# Patient Record
Sex: FEMALE | Race: WHITE | NOT HISPANIC OR LATINO
[De-identification: names, ages, dates, MRNs, and addresses within clinical notes are randomized per-mention and may not be internally consistent; named-entity substitution may affect disease eponyms.]

---

## 2018-04-24 ENCOUNTER — APPOINTMENT (OUTPATIENT)
Dept: ORTHOPEDIC SURGERY | Facility: CLINIC | Age: 48
End: 2018-04-24
Payer: COMMERCIAL

## 2018-04-24 VITALS
WEIGHT: 150 LBS | BODY MASS INDEX: 24.11 KG/M2 | SYSTOLIC BLOOD PRESSURE: 106 MMHG | HEIGHT: 66 IN | DIASTOLIC BLOOD PRESSURE: 72 MMHG

## 2018-04-24 DIAGNOSIS — M19.071 PRIMARY OSTEOARTHRITIS, RIGHT ANKLE AND FOOT: ICD-10-CM

## 2018-04-24 DIAGNOSIS — S86.111A STRAIN OF OTHER MUSCLE(S) AND TENDON(S) OF POSTERIOR MUSCLE GROUP AT LOWER LEG LEVEL, RIGHT LEG, INITIAL ENCOUNTER: ICD-10-CM

## 2018-04-24 DIAGNOSIS — M20.21 HALLUX RIGIDUS, RIGHT FOOT: ICD-10-CM

## 2018-04-24 DIAGNOSIS — Z87.891 PERSONAL HISTORY OF NICOTINE DEPENDENCE: ICD-10-CM

## 2018-04-24 PROCEDURE — 99214 OFFICE O/P EST MOD 30 MIN: CPT

## 2018-04-24 PROCEDURE — 73630 X-RAY EXAM OF FOOT: CPT | Mod: RT

## 2022-07-25 ENCOUNTER — NON-APPOINTMENT (OUTPATIENT)
Age: 52
End: 2022-07-25

## 2022-10-05 ENCOUNTER — EMERGENCY (EMERGENCY)
Facility: HOSPITAL | Age: 52
LOS: 1 days | Discharge: HOME | End: 2022-10-05
Admitting: EMERGENCY MEDICINE

## 2022-10-05 VITALS
HEIGHT: 67 IN | OXYGEN SATURATION: 96 % | DIASTOLIC BLOOD PRESSURE: 78 MMHG | RESPIRATION RATE: 18 BRPM | WEIGHT: 154.98 LBS | HEART RATE: 72 BPM | SYSTOLIC BLOOD PRESSURE: 120 MMHG | TEMPERATURE: 98 F

## 2022-10-05 LAB
ALBUMIN SERPL ELPH-MCNC: 3.7 G/DL — SIGNIFICANT CHANGE UP (ref 3.4–5)
ALP SERPL-CCNC: 98 U/L — SIGNIFICANT CHANGE UP (ref 40–120)
ALT FLD-CCNC: 16 U/L — SIGNIFICANT CHANGE UP (ref 12–42)
ANION GAP SERPL CALC-SCNC: 4 MMOL/L — LOW (ref 9–16)
AST SERPL-CCNC: 13 U/L — LOW (ref 15–37)
BASOPHILS # BLD AUTO: 0.06 K/UL — SIGNIFICANT CHANGE UP (ref 0–0.2)
BASOPHILS NFR BLD AUTO: 0.9 % — SIGNIFICANT CHANGE UP (ref 0–2)
BILIRUB SERPL-MCNC: 0.2 MG/DL — SIGNIFICANT CHANGE UP (ref 0.2–1.2)
BUN SERPL-MCNC: 13 MG/DL — SIGNIFICANT CHANGE UP (ref 7–23)
CALCIUM SERPL-MCNC: 9.1 MG/DL — SIGNIFICANT CHANGE UP (ref 8.5–10.5)
CHLORIDE SERPL-SCNC: 105 MMOL/L — SIGNIFICANT CHANGE UP (ref 96–108)
CO2 SERPL-SCNC: 30 MMOL/L — SIGNIFICANT CHANGE UP (ref 22–31)
CREAT SERPL-MCNC: 0.83 MG/DL — SIGNIFICANT CHANGE UP (ref 0.5–1.3)
D DIMER BLD IA.RAPID-MCNC: <187 NG/ML DDU — SIGNIFICANT CHANGE UP
EGFR: 86 ML/MIN/1.73M2 — SIGNIFICANT CHANGE UP
EOSINOPHIL # BLD AUTO: 0.27 K/UL — SIGNIFICANT CHANGE UP (ref 0–0.5)
EOSINOPHIL NFR BLD AUTO: 4.1 % — SIGNIFICANT CHANGE UP (ref 0–6)
GLUCOSE SERPL-MCNC: 115 MG/DL — HIGH (ref 70–99)
HCG SERPL-ACNC: 1 MIU/ML — SIGNIFICANT CHANGE UP
HCT VFR BLD CALC: 34 % — LOW (ref 34.5–45)
HGB BLD-MCNC: 11.5 G/DL — SIGNIFICANT CHANGE UP (ref 11.5–15.5)
IMM GRANULOCYTES NFR BLD AUTO: 0.3 % — SIGNIFICANT CHANGE UP (ref 0–0.9)
LYMPHOCYTES # BLD AUTO: 1.66 K/UL — SIGNIFICANT CHANGE UP (ref 1–3.3)
LYMPHOCYTES # BLD AUTO: 24.9 % — SIGNIFICANT CHANGE UP (ref 13–44)
MCHC RBC-ENTMCNC: 31.7 PG — SIGNIFICANT CHANGE UP (ref 27–34)
MCHC RBC-ENTMCNC: 33.8 GM/DL — SIGNIFICANT CHANGE UP (ref 32–36)
MCV RBC AUTO: 93.7 FL — SIGNIFICANT CHANGE UP (ref 80–100)
MONOCYTES # BLD AUTO: 0.6 K/UL — SIGNIFICANT CHANGE UP (ref 0–0.9)
MONOCYTES NFR BLD AUTO: 9 % — SIGNIFICANT CHANGE UP (ref 2–14)
NEUTROPHILS # BLD AUTO: 4.05 K/UL — SIGNIFICANT CHANGE UP (ref 1.8–7.4)
NEUTROPHILS NFR BLD AUTO: 60.8 % — SIGNIFICANT CHANGE UP (ref 43–77)
NRBC # BLD: 0 /100 WBCS — SIGNIFICANT CHANGE UP (ref 0–0)
PLATELET # BLD AUTO: 264 K/UL — SIGNIFICANT CHANGE UP (ref 150–400)
POTASSIUM SERPL-MCNC: 3.7 MMOL/L — SIGNIFICANT CHANGE UP (ref 3.5–5.3)
POTASSIUM SERPL-SCNC: 3.7 MMOL/L — SIGNIFICANT CHANGE UP (ref 3.5–5.3)
PROT SERPL-MCNC: 6.8 G/DL — SIGNIFICANT CHANGE UP (ref 6.4–8.2)
RBC # BLD: 3.63 M/UL — LOW (ref 3.8–5.2)
RBC # FLD: 12.3 % — SIGNIFICANT CHANGE UP (ref 10.3–14.5)
SODIUM SERPL-SCNC: 139 MMOL/L — SIGNIFICANT CHANGE UP (ref 132–145)
TROPONIN I, HIGH SENSITIVITY RESULT: 4.6 NG/L — SIGNIFICANT CHANGE UP
WBC # BLD: 6.66 K/UL — SIGNIFICANT CHANGE UP (ref 3.8–10.5)
WBC # FLD AUTO: 6.66 K/UL — SIGNIFICANT CHANGE UP (ref 3.8–10.5)

## 2022-10-05 PROCEDURE — 71046 X-RAY EXAM CHEST 2 VIEWS: CPT | Mod: 26

## 2022-10-05 PROCEDURE — 99285 EMERGENCY DEPT VISIT HI MDM: CPT | Mod: 25

## 2022-10-05 PROCEDURE — 93010 ELECTROCARDIOGRAM REPORT: CPT | Mod: 59

## 2022-10-05 NOTE — ED ADULT TRIAGE NOTE - CHIEF COMPLAINT QUOTE
Pt. walk in c/o 2 episodes of midsternal CP that happened yesterday night and tonight described as sharp pressure that lasted 10-20 min. Denies n/v/d. Reports SOB only during the episode.

## 2022-10-05 NOTE — ED ADULT NURSE NOTE - NSIMPLEMENTINTERV_GEN_ALL_ED
Implemented All Universal Safety Interventions:  [place] to call system. Call bell, personal items and telephone within reach. Instruct patient to call for assistance. Room bathroom lighting operational. Non-slip footwear when patient is off stretcher. Physically safe environment: no spills, clutter or unnecessary equipment. Stretcher in lowest position, wheels locked, appropriate side rails in place.

## 2022-10-06 NOTE — ED PROVIDER NOTE - OBJECTIVE STATEMENT
51 yo f pmhx sig for breast cancer pw 2 wk of cough and L sided chest pain aching mod in severity non radiating worse with palpation, movement and deep inspiration ongoing for 3 d in duration. Denies n/v, diaphoresis, sob, hematopoesis, abd pain. No unilateral swelling, hemoptysis, estogen supplementation, recent immobilization or surgery, or prior DVT/PE.    I have reviewed available current nursing and previous documentation of past medical, surgical, family, and/or social history.

## 2022-10-06 NOTE — ED PROVIDER NOTE - CARE PROVIDER_API CALL
Meng Prater)  Cardiovascular Disease  [address] [facility] [place] [address]  Phone: [phone]  Fax: [phone]  Follow Up Time: 1-3 Days

## 2022-10-06 NOTE — ED PROVIDER NOTE - PATIENT PORTAL LINK FT
You can access the FollowMyHealth Patient Portal offered by [facility] by registering at the following website: [email or web address]. By joining [employer]’s FollowMyHealth portal, you will also be able to view your health information using other applications (apps) compatible with our system.

## 2022-10-07 DIAGNOSIS — R07.89 OTHER CHEST PAIN: ICD-10-CM

## 2022-10-07 DIAGNOSIS — R05.1 ACUTE COUGH: ICD-10-CM

## 2022-10-07 DIAGNOSIS — Z85.3 PERSONAL HISTORY OF MALIGNANT NEOPLASM OF BREAST: ICD-10-CM

## 2022-12-01 ENCOUNTER — EMERGENCY (EMERGENCY)
Facility: HOSPITAL | Age: 52
LOS: 1 days | Discharge: HOME | End: 2022-12-01
Admitting: EMERGENCY MEDICINE
Payer: COMMERCIAL

## 2022-12-01 VITALS
TEMPERATURE: 98 F | OXYGEN SATURATION: 99 % | RESPIRATION RATE: 16 BRPM | SYSTOLIC BLOOD PRESSURE: 127 MMHG | HEART RATE: 78 BPM | DIASTOLIC BLOOD PRESSURE: 74 MMHG

## 2022-12-01 VITALS
TEMPERATURE: 98 F | OXYGEN SATURATION: 97 % | HEART RATE: 84 BPM | WEIGHT: 156.53 LBS | SYSTOLIC BLOOD PRESSURE: 132 MMHG | RESPIRATION RATE: 16 BRPM | DIASTOLIC BLOOD PRESSURE: 85 MMHG | HEIGHT: 67 IN

## 2022-12-01 DIAGNOSIS — R42 DIZZINESS AND GIDDINESS: ICD-10-CM

## 2022-12-01 DIAGNOSIS — G43.909 MIGRAINE, UNSPECIFIED, NOT INTRACTABLE, WITHOUT STATUS MIGRAINOSUS: ICD-10-CM

## 2022-12-01 DIAGNOSIS — B34.9 VIRAL INFECTION, UNSPECIFIED: ICD-10-CM

## 2022-12-01 DIAGNOSIS — E03.9 HYPOTHYROIDISM, UNSPECIFIED: ICD-10-CM

## 2022-12-01 LAB
ALBUMIN SERPL ELPH-MCNC: 4.1 G/DL — SIGNIFICANT CHANGE UP (ref 3.4–5)
ALP SERPL-CCNC: 104 U/L — SIGNIFICANT CHANGE UP (ref 40–120)
ALT FLD-CCNC: 29 U/L — SIGNIFICANT CHANGE UP (ref 12–42)
ANION GAP SERPL CALC-SCNC: 8 MMOL/L — LOW (ref 9–16)
AST SERPL-CCNC: 19 U/L — SIGNIFICANT CHANGE UP (ref 15–37)
BASOPHILS # BLD AUTO: 0.05 K/UL — SIGNIFICANT CHANGE UP (ref 0–0.2)
BASOPHILS NFR BLD AUTO: 0.7 % — SIGNIFICANT CHANGE UP (ref 0–2)
BILIRUB SERPL-MCNC: 0.3 MG/DL — SIGNIFICANT CHANGE UP (ref 0.2–1.2)
BUN SERPL-MCNC: 14 MG/DL — SIGNIFICANT CHANGE UP (ref 7–23)
CALCIUM SERPL-MCNC: 9.6 MG/DL — SIGNIFICANT CHANGE UP (ref 8.5–10.5)
CHLORIDE SERPL-SCNC: 104 MMOL/L — SIGNIFICANT CHANGE UP (ref 96–108)
CO2 SERPL-SCNC: 30 MMOL/L — SIGNIFICANT CHANGE UP (ref 22–31)
CREAT SERPL-MCNC: 0.75 MG/DL — SIGNIFICANT CHANGE UP (ref 0.5–1.3)
EGFR: 97 ML/MIN/1.73M2 — SIGNIFICANT CHANGE UP
EOSINOPHIL # BLD AUTO: 0.07 K/UL — SIGNIFICANT CHANGE UP (ref 0–0.5)
EOSINOPHIL NFR BLD AUTO: 0.9 % — SIGNIFICANT CHANGE UP (ref 0–6)
GLUCOSE SERPL-MCNC: 104 MG/DL — HIGH (ref 70–99)
HCG SERPL-ACNC: 1 MIU/ML — SIGNIFICANT CHANGE UP
HCT VFR BLD CALC: 41.1 % — SIGNIFICANT CHANGE UP (ref 34.5–45)
HGB BLD-MCNC: 13.6 G/DL — SIGNIFICANT CHANGE UP (ref 11.5–15.5)
IMM GRANULOCYTES NFR BLD AUTO: 0.4 % — SIGNIFICANT CHANGE UP (ref 0–0.9)
LYMPHOCYTES # BLD AUTO: 1.09 K/UL — SIGNIFICANT CHANGE UP (ref 1–3.3)
LYMPHOCYTES # BLD AUTO: 14.7 % — SIGNIFICANT CHANGE UP (ref 13–44)
MCHC RBC-ENTMCNC: 30.8 PG — SIGNIFICANT CHANGE UP (ref 27–34)
MCHC RBC-ENTMCNC: 33.1 GM/DL — SIGNIFICANT CHANGE UP (ref 32–36)
MCV RBC AUTO: 93 FL — SIGNIFICANT CHANGE UP (ref 80–100)
MONOCYTES # BLD AUTO: 0.44 K/UL — SIGNIFICANT CHANGE UP (ref 0–0.9)
MONOCYTES NFR BLD AUTO: 6 % — SIGNIFICANT CHANGE UP (ref 2–14)
NEUTROPHILS # BLD AUTO: 5.71 K/UL — SIGNIFICANT CHANGE UP (ref 1.8–7.4)
NEUTROPHILS NFR BLD AUTO: 77.3 % — HIGH (ref 43–77)
NRBC # BLD: 0 /100 WBCS — SIGNIFICANT CHANGE UP (ref 0–0)
NT-PROBNP SERPL-SCNC: 48 PG/ML — SIGNIFICANT CHANGE UP
PLATELET # BLD AUTO: 282 K/UL — SIGNIFICANT CHANGE UP (ref 150–400)
POTASSIUM SERPL-MCNC: 4.1 MMOL/L — SIGNIFICANT CHANGE UP (ref 3.5–5.3)
POTASSIUM SERPL-SCNC: 4.1 MMOL/L — SIGNIFICANT CHANGE UP (ref 3.5–5.3)
PROT SERPL-MCNC: 7.8 G/DL — SIGNIFICANT CHANGE UP (ref 6.4–8.2)
RBC # BLD: 4.42 M/UL — SIGNIFICANT CHANGE UP (ref 3.8–5.2)
RBC # FLD: 12 % — SIGNIFICANT CHANGE UP (ref 10.3–14.5)
SODIUM SERPL-SCNC: 142 MMOL/L — SIGNIFICANT CHANGE UP (ref 132–145)
TROPONIN I, HIGH SENSITIVITY RESULT: <4 NG/L — SIGNIFICANT CHANGE UP
WBC # BLD: 7.39 K/UL — SIGNIFICANT CHANGE UP (ref 3.8–10.5)
WBC # FLD AUTO: 7.39 K/UL — SIGNIFICANT CHANGE UP (ref 3.8–10.5)

## 2022-12-01 PROCEDURE — 99284 EMERGENCY DEPT VISIT MOD MDM: CPT

## 2022-12-01 PROCEDURE — 70496 CT ANGIOGRAPHY HEAD: CPT | Mod: 26

## 2022-12-01 PROCEDURE — 70498 CT ANGIOGRAPHY NECK: CPT | Mod: 26

## 2022-12-01 PROCEDURE — 93010 ELECTROCARDIOGRAM REPORT: CPT | Mod: 59

## 2022-12-01 PROCEDURE — 99285 EMERGENCY DEPT VISIT HI MDM: CPT

## 2022-12-01 RX ORDER — DIPHENHYDRAMINE HCL 50 MG
50 CAPSULE ORAL ONCE
Refills: 0 | Status: COMPLETED | OUTPATIENT
Start: 2022-12-01 | End: 2022-12-01

## 2022-12-01 RX ORDER — SODIUM CHLORIDE 9 MG/ML
1000 INJECTION INTRAMUSCULAR; INTRAVENOUS; SUBCUTANEOUS ONCE
Refills: 0 | Status: COMPLETED | OUTPATIENT
Start: 2022-12-01 | End: 2022-12-01

## 2022-12-01 RX ORDER — DEXAMETHASONE 0.5 MG/5ML
10 ELIXIR ORAL ONCE
Refills: 0 | Status: COMPLETED | OUTPATIENT
Start: 2022-12-01 | End: 2022-12-01

## 2022-12-01 RX ORDER — METOCLOPRAMIDE HCL 10 MG
10 TABLET ORAL ONCE
Refills: 0 | Status: COMPLETED | OUTPATIENT
Start: 2022-12-01 | End: 2022-12-01

## 2022-12-01 RX ORDER — METOCLOPRAMIDE HCL 10 MG
1 TABLET ORAL
Qty: 12 | Refills: 0
Start: 2022-12-01 | End: 2022-12-30

## 2022-12-01 RX ADMIN — Medication 50 MILLIGRAM(S): at 18:44

## 2022-12-01 RX ADMIN — SODIUM CHLORIDE 1000 MILLILITER(S): 9 INJECTION INTRAMUSCULAR; INTRAVENOUS; SUBCUTANEOUS at 18:44

## 2022-12-01 RX ADMIN — Medication 10 MILLIGRAM(S): at 18:44

## 2022-12-01 RX ADMIN — Medication 104 MILLIGRAM(S): at 18:44

## 2022-12-01 NOTE — ED PROVIDER NOTE - EYES [+], MLM
Detail Level: Simple Sunscreen Recommendations: I recommended a broad spectrum sunscreen with a SPF of 30 or higher. I explained that SPF 30 sunscreens block approximately 97 percent of the sun's harmful rays. Sunscreens should be applied at least 15 minutes prior to expected sun exposure and then every 2 hours after that as long as sun exposure continues. If swimming or exercising sunscreen should be reapplied every 45 minutes to an hour after getting wet or sweating. Recommended protective clothing and sun avoidance at peak hours. + photophobia Detail Level: Generalized Detail Level: Zone

## 2022-12-01 NOTE — ED PROVIDER NOTE - PROGRESS NOTE DETAILS
Received signout from YANA Rogers, patient reassessed at bedside with improvement in symptoms.  Currently pain-free.  Labs, CT results explained to patient at bedside.  Symptom likely viral induced given the patient has kids at home who are sick with influenza recently.  We will reswab patient, supportive care, encourage prompt follow-up with PMD, neurology as needed.  Patient verbalized understanding.

## 2022-12-01 NOTE — ED ADULT NURSE NOTE - CAS DISCH BELONGINGS RETURNED
Normal vision: sees adequately in most situations; can see medication labels, newsprint
Not applicable

## 2022-12-01 NOTE — ED PROVIDER NOTE - PATIENT PORTAL LINK FT
You can access the FollowMyHealth Patient Portal offered by [facility] by registering at the following website: [email or web address]. By joining [employer]’s FollowMyHealth portal, you will also be able to view your health information using other applications (apps) compatible with our system.

## 2022-12-01 NOTE — ED ADULT NURSE NOTE - OBJECTIVE STATEMENT
pt is 52y female, here for headache and nausea starting this am, pt denies any eye changes, CP, SOB or fevers, pt is a&ox3, speech, clear, na arm drift or facial droop, no neuro deficits noted, NAD present

## 2022-12-01 NOTE — ED PROVIDER NOTE - CARE PROVIDER_API CALL
Farnaz Dick)  Neurology; Vascular Neurology  [address], [address] [place] [address]  Phone: [phone]  Fax: [phone]  Follow Up Time:

## 2022-12-01 NOTE — ED ADULT TRIAGE NOTE - CHIEF COMPLAINT QUOTE
patient walk in from home with c/o migraine headache, dizziness, vomiting; felt dizzy last night and woke up around 630am withe headache; negative tests for flu and covid today; +sick contacts at home; took excedrin migraine around 3pm and pain alleviated; BEFAST negative in triage

## 2022-12-01 NOTE — ED PROVIDER NOTE - CLINICAL SUMMARY MEDICAL DECISION MAKING FREE TEXT BOX
pt presents with atypical headache with dizziness, nausea, vomiting, photophobia since last night, worsening today. improved with excedrine migraine at home but her pmd advised her to come to the ED. covid and flu negative at . labs wnl in ED. improved after headache cocktail. pending imaging. signed out to night team.

## 2024-02-26 PROBLEM — E05.90 THYROTOXICOSIS, UNSPECIFIED WITHOUT THYROTOXIC CRISIS OR STORM: Chronic | Status: ACTIVE | Noted: 2022-12-01

## 2024-03-27 ENCOUNTER — NON-APPOINTMENT (OUTPATIENT)
Age: 54
End: 2024-03-27

## 2024-03-27 DIAGNOSIS — Z86.2 PERSONAL HISTORY OF DISEASES OF THE BLOOD AND BLOOD-FORMING ORGANS AND CERTAIN DISORDERS INVOLVING THE IMMUNE MECHANISM: ICD-10-CM

## 2024-03-27 DIAGNOSIS — Z87.59 PERSONAL HISTORY OF OTHER COMPLICATIONS OF PREGNANCY, CHILDBIRTH AND THE PUERPERIUM: ICD-10-CM

## 2024-03-27 DIAGNOSIS — Z92.3 PERSONAL HISTORY OF IRRADIATION: ICD-10-CM

## 2024-03-27 DIAGNOSIS — Z87.898 PERSONAL HISTORY OF OTHER SPECIFIED CONDITIONS: ICD-10-CM

## 2024-03-27 DIAGNOSIS — Z80.3 FAMILY HISTORY OF MALIGNANT NEOPLASM OF BREAST: ICD-10-CM

## 2024-03-27 DIAGNOSIS — Z78.9 OTHER SPECIFIED HEALTH STATUS: ICD-10-CM

## 2024-06-06 ENCOUNTER — APPOINTMENT (OUTPATIENT)
Dept: HEMATOLOGY ONCOLOGY | Facility: CLINIC | Age: 54
End: 2024-06-06
Payer: COMMERCIAL

## 2024-06-06 VITALS
DIASTOLIC BLOOD PRESSURE: 85 MMHG | SYSTOLIC BLOOD PRESSURE: 127 MMHG | TEMPERATURE: 97.8 F | HEIGHT: 66 IN | WEIGHT: 181 LBS | RESPIRATION RATE: 18 BRPM | BODY MASS INDEX: 29.09 KG/M2 | HEART RATE: 79 BPM | OXYGEN SATURATION: 97 %

## 2024-06-06 DIAGNOSIS — E03.9 HYPOTHYROIDISM, UNSPECIFIED: ICD-10-CM

## 2024-06-06 DIAGNOSIS — Z86.39 PERSONAL HISTORY OF OTHER ENDOCRINE, NUTRITIONAL AND METABOLIC DISEASE: ICD-10-CM

## 2024-06-06 DIAGNOSIS — Z92.21 PERSONAL HISTORY OF ANTINEOPLASTIC CHEMOTHERAPY: ICD-10-CM

## 2024-06-06 DIAGNOSIS — C50.919 MALIGNANT NEOPLASM OF UNSPECIFIED SITE OF UNSPECIFIED FEMALE BREAST: ICD-10-CM

## 2024-06-06 DIAGNOSIS — B27.00 GAMMAHERPESVIRAL MONONUCLEOSIS W/OUT COMPLICATION: ICD-10-CM

## 2024-06-06 DIAGNOSIS — L30.9 DERMATITIS, UNSPECIFIED: ICD-10-CM

## 2024-06-06 PROCEDURE — G2211 COMPLEX E/M VISIT ADD ON: CPT

## 2024-06-06 PROCEDURE — 99214 OFFICE O/P EST MOD 30 MIN: CPT

## 2024-06-06 RX ORDER — CETIRIZINE HYDROCHLORIDE 10 MG/1
10 TABLET, FILM COATED ORAL
Refills: 0 | Status: ACTIVE | COMMUNITY
Start: 2024-06-06

## 2024-06-06 RX ORDER — AZELASTINE HYDROCHLORIDE 137 UG/1
0.1 SPRAY, METERED NASAL
Refills: 0 | Status: ACTIVE | COMMUNITY
Start: 2024-06-06

## 2024-06-06 RX ORDER — DUPILUMAB 200 MG/1.14ML
200 INJECTION, SOLUTION SUBCUTANEOUS
Refills: 0 | Status: ACTIVE | COMMUNITY
Start: 2024-06-06

## 2024-06-06 RX ORDER — METFORMIN HYDROCHLORIDE 500 MG/1
500 TABLET, COATED ORAL DAILY
Refills: 0 | Status: ACTIVE | COMMUNITY
Start: 2024-06-06

## 2024-06-06 RX ORDER — VITAMIN K2 90 MCG
1000 CAPSULE ORAL EVERY OTHER DAY
Refills: 0 | Status: ACTIVE | COMMUNITY
Start: 2024-06-06

## 2024-06-06 RX ORDER — PNV NO.95/FERROUS FUM/FOLIC AC 28MG-0.8MG
100 TABLET ORAL DAILY
Refills: 0 | Status: ACTIVE | COMMUNITY
Start: 2024-06-06

## 2024-06-08 PROBLEM — Z92.21 HISTORY OF CHEMOTHERAPY: Status: RESOLVED | Noted: 2024-03-27 | Resolved: 2024-06-08

## 2024-06-08 PROBLEM — Z86.39 HISTORY OF HYPOTHYROIDISM: Status: RESOLVED | Noted: 2024-03-27 | Resolved: 2024-06-08

## 2024-06-08 NOTE — ASSESSMENT
[FreeTextEntry1] : Triple neg, locally advacnced Rt breast ca 2012. Path CR S/P dose-dense neoadjuvant AC-T and Herceptin, S/P WLE/ SLN and WBRT.11/2015 new primary in the previously treated Rt  breast, 2 foci, 1 mm and 2 mm poorly differentiated infiltrating duct carcinoma, ER/VA negative, and HER-2 positive with associated DCIS. S/P bilat mastectomy .No chemo indicated.  Counsyl 9 gene panel neg in past - refer to genetics for updated panel. Reviewed surveillance. Reviewed diet, wt reduction and exercise. Cont. Metformin. Eczema resolved with Dupixent. Labs as per PMD for recently dx'd mono - F/U 6 months

## 2024-06-08 NOTE — REVIEW OF SYSTEMS
[Fatigue] : fatigue [Diarrhea: Grade 0] : Diarrhea: Grade 0 [Negative] : Allergic/Immunologic [FreeTextEntry7] : discomfort due to splenomagaly [FreeTextEntry2] : fatigue x 1 month d/t mono

## 2024-06-08 NOTE — PHYSICAL EXAM
[Fully active, able to carry on all pre-disease performance without restriction] : Status 0 - Fully active, able to carry on all pre-disease performance without restriction [Normal] : affect appropriate [de-identified] : s/p bilat NSP mastectomy - no reconstruction, Rt RT, no palp LN

## 2024-06-08 NOTE — HISTORY OF PRESENT ILLNESS
[de-identified] : BRCA negative female who presented with locally advanced right breast cancer in 2012, ER and AL negative and HER-2 positive.  The patient received neoadjuvant chemotherapy with dose-dense AC-T and Herceptin.  12, pathologic complete remission, right wide excision at 9 o'clock - no tumor and 14 lymph nodes were negative.  The patient received postoperative radiation therapy to breast and regional nodes, and completed one yr of Herceptin.  The patient has remained clinically AMADOR and has been followed with close surveillance.  On 10/5/15, bilateral mammogram and sonogram was without evidence of abnormality.  On 11/25/15, bilateral breast MRI revealed central slightly outer right breast, 0.6 x 0.7 x 0.5 enhancing mass.  Targeted ultrasound did not reveal abnormality.  MRI-guided core biopsy on 12/16/15, revealed invasive ductal carcinoma and DCIS, invasive ductal carcinoma poorly differentiated presents as 2 foci measuring 2 mm and 1 mm, ER negative, AL negative, Ki-67 greater than 15% and HER-2 3+, and DCIS solid and micropapillary types with high-nuclear grade necrosis and calcifications.  Invasive carcinoma in this biopsy is morphologically similar to that seen on patient's previous biopsy (SC ).  PET/CT - no mets 12/30/15, the patient underwent wide excision, which revealed DCIS, solid and architectural type with high nuclear grade necrosis and calcifications and brisk lymphocytic infiltrate, multifocal, largest focus 0.7 cm, surrounding breast tissues shows radiation therapy related changes and biopsy site changes and margins  widely negative.  The patient opted for bilateral mastectomy, but delayed this due to family and social reasons.  On 16, patient underwent bilateral nipple sparing mastectomies without reconstruction, no residual carcinoma was seen.  Postoperatively, the patient has had delayed wound healing and necrosis of nipples. Recovering from mono - dx citi md 24 - inc fatigue, lymphadenopathy - feeling better - Saw PCP last week - had labs Told splenomegaly- mild discomfort. BMD 23 wnl. - saw Dr Childers- Wt up recommended metformin - started 5 days ago  Menarche age 13, , LMP , GYN 2023 OCP for 2 months, S/P 2 cycles of Clomid and several cycles of Follistim for IVF.  Hx hyperstimulation syndrome.  Hx preeclampsia.  Hx polycystic ovaries without syndrome.  Hx hypothyroidism.  HPV positive S/P cryosurgery.   age 38 and 40, colonoscopy 10/21 .Lt frozen shoulder    FAMILY HISTORY:  Delectableyl 9 gene panel negMother is of [place] descent and has 3 brothers.  Father is [place] descent had one sister who had breast cancer at age 57 and is alive and well at age 62, and there were 2 additional paternal aunts and one uncle.  One male paternal cousin  at ae 33 of leukemia.  The patient has one brother, 4 children.    SOCIAL HISTORY:  She was a  and now a full-time homemaker.  Lives with her wife , and 4 children.  She had smoked 12 pack years, none since .  She drinks alcohol socially occasionally.  She is vegetarian.

## 2024-08-06 ENCOUNTER — APPOINTMENT (OUTPATIENT)
Dept: HEMATOLOGY ONCOLOGY | Facility: CLINIC | Age: 54
End: 2024-08-06

## 2024-08-07 NOTE — DISCUSSION/SUMMARY
[FreeTextEntry1] : REASON FOR CONSULT: Asha Ibarra is a 54-year-old female referred by Dr. Cecelia Lopez for cancer genetic counseling and update testing due to a personal and family history of cancer. Ms. Ibarra was seen on 2024 at which time medical and family history was ascertained and a pedigree constructed. Genetic counseling , Rehan Lind, also participated in this session.  RELEVANT MEDICAL HISTORY: Ms. Ibarra was diagnosed with a locally advanced right breast cancer in  at the age of 41. Pathology report revealed invasive ductal carcinoma (ER-/VA-/HER2+). She underwent neoadjuvant chemo followed by lumpectomy, radiation therapy, and Herceptin. She was again diagnosed with a right breast cancer in  at the age of 45 and pathology revealed invasive ductal carcinoma (ER-/VA-/HER2+) and DCIS. Pathology was morphologically similar to patient's initial cancer and was presumed to be a recurrence. She underwent lumpectomy followed by bilateral mastectomy (delayed due to personal reasons).  Patient initially underwent genetic testing using [employer] [employer] reported on 3/29/2012 that was negative for variants in BRCA1/BRCA2. She reported she later underwent 9 gene breast panel testing however we have not been able to locate a copy of that report.   OTHER MEDICAL AND SURGICAL HISTORY: -	Medical History: mild hypothyroid -	Surgical History: toe surgery  OB/GYN HISTORY: Obstetrical History:  (2 biological children carried by patient and 2 biological children carried by patient's wife using patient's eggs, 4 biological children total) Age at Menarche: 13 Menopausal Status: Post-menopausal with LMP at age 52  Age at First Live Birth: 38 Oral Contraceptive Use: No Hormone Replacement Therapy: No  CANCER SCREENING HISTORY:   Breast: clinical exams as needed post mastectomy GYN: -	Pelvic Examination: 2023- reportedly wnl Colon: -	Colonoscopy: - no history of polyps reported, repeat in 10 years  Skin:   -	FBSE: No -	Lesions biopsied/removed: No  SOCIAL HISTORY: -	Tobacco-product use: Yes, former from [id number] -	Environmental exposures: No  FAMILY HISTORY: Maternal ancestry was reported as [place]mese and paternal ancestry was reported as [place]ese/Turkmen. Ashkenazi Alevism ancestry was denied. A detailed family history of cancer was ascertained, see below and scanned chart for pedigree.   According to Ms. Ibarra no one else in the family has had germline testing for cancer susceptibility. Consanguinity was denied.  	 RISK ASSESSMENT: Ms. Ibarra's personal and family history is suggestive of a hereditary cancer syndrome given her history of breast cancer at age 41 and paternal aunt's history of breast cancer. The patient meets National Comprehensive Cancer Network (NCCN) criteria for genetic testing. We recommended update genetic testing using [employer]'s [place] update test. This test analyzes 48 genes: MICHAEL, APC, AXIN2, BAP1, BARD1, BMPR1A, BRCA1, BRCA2, BRIP1, CDH1, CDK4, CDKN2A, CHEK2, CTNNA1, EGFR, EPCAM, FH, FLCN, GREM1, HOXB13, MEN1, MET, MITF, MLH1, MSH2, MSH3, MSH6, MUTYH, NF1, NTLH1, PALB2, POLD1, POLE, PMS2, PTEN, RAD51C, RAD51D, RET, SDHA, SDHB, SDHC, SDHD SMAD4, STK11, TERT, TSC1, TSC2, TP53 and VHL.  The risks, benefits and limitations of genetic testing were discussed with Ms. Ibarra. In addition, we discussed the purpose of genetic testing and possible test results (positive, negative, inconclusive) along with associated medical management options and psychosocial implications. Insurance coverage and potential out of pocket costs were also discussed.   It was explained that risk assessment is based upon medical and family history as provided and may change in the future should new information be obtained.   Following our discussion, Ms. Ibarra consented to the above-mentioned genetic testing panel. Blood was drawn in our laboratory and sent to [email or web address] today.  PLAN:  1.	Blood drawn today will be sent to [email or web address] for analysis.  2.	We will contact Ms. Ibarra to schedule a follow-up appointment once the results are available. Results generally return in 2-3 weeks.   For any additional questions please call Cancer Genetics at [phone].    Marcia Lemus MS, [facility] Genetic Counselor, Cancer Genetics

## 2024-08-13 ENCOUNTER — NON-APPOINTMENT (OUTPATIENT)
Age: 54
End: 2024-08-13

## 2024-08-14 NOTE — DISCUSSION/SUMMARY
[FreeTextEntry1] : RESULTS TRANSMISSION:   Asha Ibarra is a 54-year-old female who was contacted on August 13, 2024 for a discussion regarding her negative genetic testing results related to hereditary cancer predisposition. This session was conducted via telephone.   Ms. Ibarra was originally seen by the Cancer Genetics Service on August 6, 2024 for hereditary cancer predisposition risk assessment due to a personal and family history of cancer. At that time, Ms. Ibarra decided to pursue genetic testing using [facility] test.  TEST RESULTS: NEGATIVE NO pathogenic (disease-causing) variants or variants of uncertain significance were detected in any of the following genes [48]:  MICHAEL, APC, AXIN2, BAP1, BARD1, BMPR1A, BRCA1, BRCA2, BRIP1, CDH1, CDK4, CDKN2A, CHEK2, CTNNA1, EGFR, EPCAM, FH, FLCN, GREM1, HOXB13, MEN1, MET, MITF, MLH1, MSH2, MSH3, MSH6, MUTYH, NF1, NTLH1, PALB2, POLD1, POLE, PMS2, PTEN, RAD51C, RAD51D, RET, SDHA, SDHB, SDHC, SDHD SMAD4, STK11, TERT, TSC1, TSC2, TP53 and VHL.  RESULTS INTERPRETATION AND ASSESSMENT: Given Ms. Ibarra's personal and current reported family history of cancer, and her negative genetic test results, the following screening guidelines and risk-reducing recommendations were discussed:  BREAST:  -	Given these negative genetic testing results, long-term management and surveillance should be based on Ms. Ibarra's on- or post-treatment protocol as recommended by her oncologist.  OTHER: -	In the absence of other indications, Ms. Ibarra should practice age-appropriate cancer screening of other organ systems as recommended for the general population.  We also discussed that, while no cause of the patient's personal and family history of cancer was identified, this result, while reassuring, does entirely not rule out a hereditary cancer risk in the patient. It is possible, although unlikely, the patient has a mutation in one of the genes tested that is not detectable by this analysis, or has a mutation in a different gene, either known or unknown. It is also possible there is a hereditary cancer predisposition in the family, but the patient did not inherit it.   We informed Ms. Ibarra that our knowledge of genetics and inherited cancer conditions is changing rapidly. Therefore, we recommended that Ms. Ibarra contact our office, every 2 to 3 years, to discuss relevant advances in cancer genetics.  We emphasized the importance of re-contacting us with updates regarding her personal and family history of cancer as well as any updates regarding additional cancer genetic test results performed for the patient and/or family members.  Such updates could possibly change our risk assessment and recommendations.   PLAN: 1.	These results do not change Ms. Ibarra's medical management. Long-term management and surveillance should be based on the patient's on- or post-treatment protocol as recommended by her oncologist (and general population guidelines for other cancers). 2.	Patient informed consult note(s) will be available through their [facility] patient portal and genetic test results will be released via [facility]'s portal. 3.	Ms. Ibarra was encouraged to contact us every 2-3 years to discuss relevant advances in cancer genetics, or sooner if there are any changes in her personal or family history of cancer.   For any additional questions please call Cancer Genetics at [phone].    Marcia Lemus MS, [facility] Genetic Counselor, Cancer Genetics

## 2025-02-27 ENCOUNTER — NON-APPOINTMENT (OUTPATIENT)
Age: 55
End: 2025-02-27

## 2025-02-27 ENCOUNTER — APPOINTMENT (OUTPATIENT)
Dept: HEMATOLOGY ONCOLOGY | Facility: CLINIC | Age: 55
End: 2025-02-27
Payer: COMMERCIAL

## 2025-02-27 VITALS
WEIGHT: 174.13 LBS | TEMPERATURE: 98.1 F | RESPIRATION RATE: 18 BRPM | DIASTOLIC BLOOD PRESSURE: 83 MMHG | BODY MASS INDEX: 28.32 KG/M2 | OXYGEN SATURATION: 97 % | HEART RATE: 86 BPM | SYSTOLIC BLOOD PRESSURE: 122 MMHG | HEIGHT: 65.75 IN

## 2025-02-27 DIAGNOSIS — Z17.1 MALIGNANT NEOPLASM OF UNSPECIFIED SITE OF RIGHT FEMALE BREAST: ICD-10-CM

## 2025-02-27 DIAGNOSIS — J32.9 CHRONIC SINUSITIS, UNSPECIFIED: ICD-10-CM

## 2025-02-27 DIAGNOSIS — C50.911 MALIGNANT NEOPLASM OF UNSPECIFIED SITE OF RIGHT FEMALE BREAST: ICD-10-CM

## 2025-02-27 DIAGNOSIS — E03.9 HYPOTHYROIDISM, UNSPECIFIED: ICD-10-CM

## 2025-02-27 LAB
ALBUMIN SERPL ELPH-MCNC: 4 G/DL
ALP BLD-CCNC: 108 U/L
ALT SERPL-CCNC: 15 U/L
ANION GAP SERPL CALC-SCNC: 5 MMOL/L
AST SERPL-CCNC: 24 U/L
BILIRUB SERPL-MCNC: 0.6 MG/DL
BUN SERPL-MCNC: 10 MG/DL
CALCIUM SERPL-MCNC: 10.6 MG/DL
CHLORIDE SERPL-SCNC: 110 MMOL/L
CO2 SERPL-SCNC: 27 MMOL/L
CREAT SERPL-MCNC: 1 MG/DL
EGFR: 68 ML/MIN/1.73M2
GLUCOSE SERPL-MCNC: 101 MG/DL
HCT VFR BLD CALC: 37.6 %
HGB BLD-MCNC: 12.6 G/DL
LYMPHOCYTES # BLD AUTO: 1.1 K/UL
LYMPHOCYTES NFR BLD AUTO: 16.5 %
MAN DIFF?: NO
MCHC RBC-ENTMCNC: 30.3 PG
MCHC RBC-ENTMCNC: 33.5 G/DL
MCV RBC AUTO: 90.4 FL
NEUTROPHILS # BLD AUTO: 5.1 K/UL
NEUTROPHILS NFR BLD AUTO: 77.6 %
PLATELET # BLD AUTO: 292 K/UL
POTASSIUM SERPL-SCNC: 5.1 MMOL/L
PROT SERPL-MCNC: 7.6 G/DL
RBC # BLD: 4.16 M/UL
RBC # FLD: 13.2 %
SODIUM SERPL-SCNC: 142 MMOL/L
WBC # FLD AUTO: 6.6 K/UL

## 2025-02-27 PROCEDURE — 36415 COLL VENOUS BLD VENIPUNCTURE: CPT

## 2025-02-27 PROCEDURE — 99214 OFFICE O/P EST MOD 30 MIN: CPT | Mod: 25

## 2025-02-27 RX ORDER — AMOXICILLIN AND CLAVULANATE POTASSIUM 500; 125 MG/1; 1/1
500-125 TABLET, FILM COATED ORAL
Refills: 0 | Status: ACTIVE | COMMUNITY
Start: 2025-02-27

## 2025-02-28 LAB
25(OH)D3 SERPL-MCNC: 46.6 NG/ML
CANCER AG15-3 SERPL-ACNC: 17.9 U/ML
CEA SERPL-MCNC: 1 NG/ML
TSH SERPL-ACNC: 1.19 UIU/ML

## 2025-09-10 ENCOUNTER — APPOINTMENT (OUTPATIENT)
Dept: HEMATOLOGY ONCOLOGY | Facility: CLINIC | Age: 55
End: 2025-09-10
Payer: COMMERCIAL

## 2025-09-10 VITALS
DIASTOLIC BLOOD PRESSURE: 82 MMHG | SYSTOLIC BLOOD PRESSURE: 111 MMHG | HEART RATE: 95 BPM | OXYGEN SATURATION: 97 % | BODY MASS INDEX: 28.97 KG/M2 | WEIGHT: 178.13 LBS | HEIGHT: 65.75 IN | RESPIRATION RATE: 18 BRPM | TEMPERATURE: 98.1 F

## 2025-09-10 DIAGNOSIS — C50.911 MALIGNANT NEOPLASM OF UNSPECIFIED SITE OF RIGHT FEMALE BREAST: ICD-10-CM

## 2025-09-10 DIAGNOSIS — M19.90 UNSPECIFIED OSTEOARTHRITIS, UNSPECIFIED SITE: ICD-10-CM

## 2025-09-10 DIAGNOSIS — L30.9 DERMATITIS, UNSPECIFIED: ICD-10-CM

## 2025-09-10 DIAGNOSIS — Z17.1 MALIGNANT NEOPLASM OF UNSPECIFIED SITE OF RIGHT FEMALE BREAST: ICD-10-CM

## 2025-09-10 LAB
ALBUMIN SERPL ELPH-MCNC: 3.7 G/DL
ALP BLD-CCNC: 100 U/L
ALT SERPL-CCNC: 24 U/L
ANION GAP SERPL CALC-SCNC: 5 MMOL/L
AST SERPL-CCNC: 24 U/L
BILIRUB SERPL-MCNC: 0.8 MG/DL
BUN SERPL-MCNC: 9 MG/DL
CALCIUM SERPL-MCNC: 9.8 MG/DL
CHLORIDE SERPL-SCNC: 111 MMOL/L
CO2 SERPL-SCNC: 26 MMOL/L
CREAT SERPL-MCNC: 0.8 MG/DL
EGFRCR SERPLBLD CKD-EPI 2021: 88 ML/MIN/1.73M2
GLUCOSE SERPL-MCNC: 108 MG/DL
HCT VFR BLD CALC: 35.6 %
HGB BLD-MCNC: 11.9 G/DL
LYMPHOCYTES # BLD AUTO: 1.3 K/UL
LYMPHOCYTES NFR BLD AUTO: 20.1 %
MAN DIFF?: NO
MCHC RBC-ENTMCNC: 29.5 PG
MCHC RBC-ENTMCNC: 33.4 G/DL
MCV RBC AUTO: 88.3 FL
NEUTROPHILS # BLD AUTO: 4.6 K/UL
NEUTROPHILS NFR BLD AUTO: 72.8 %
PLATELET # BLD AUTO: 295 K/UL
POTASSIUM SERPL-SCNC: 4.4 MMOL/L
PROT SERPL-MCNC: 7.2 G/DL
RBC # BLD: 4.03 M/UL
RBC # FLD: 13.4 %
SODIUM SERPL-SCNC: 142 MMOL/L
WBC # FLD AUTO: 6.3 K/UL

## 2025-09-10 PROCEDURE — 99214 OFFICE O/P EST MOD 30 MIN: CPT | Mod: 25

## 2025-09-10 PROCEDURE — 36415 COLL VENOUS BLD VENIPUNCTURE: CPT

## 2025-09-11 LAB
25(OH)D3 SERPL-MCNC: 41.6 NG/ML
CANCER AG15-3 SERPL-ACNC: 17.1 U/ML
CEA SERPL-MCNC: 0.9 NG/ML